# Patient Record
Sex: MALE | Race: WHITE | NOT HISPANIC OR LATINO | Employment: OTHER | URBAN - METROPOLITAN AREA
[De-identification: names, ages, dates, MRNs, and addresses within clinical notes are randomized per-mention and may not be internally consistent; named-entity substitution may affect disease eponyms.]

---

## 2018-05-17 ENCOUNTER — TRANSFERRED RECORDS (OUTPATIENT)
Dept: HEALTH INFORMATION MANAGEMENT | Facility: CLINIC | Age: 68
End: 2018-05-17

## 2018-05-25 RX ORDER — TRIPROLIDINE/PSEUDOEPHEDRINE 2.5MG-60MG
1 TABLET ORAL EVERY 6 HOURS PRN
COMMUNITY

## 2018-05-30 ENCOUNTER — ANESTHESIA EVENT (OUTPATIENT)
Dept: SURGERY | Facility: CLINIC | Age: 68
End: 2018-05-30
Payer: MEDICARE

## 2018-05-30 ENCOUNTER — ANESTHESIA (OUTPATIENT)
Dept: SURGERY | Facility: CLINIC | Age: 68
End: 2018-05-30
Payer: MEDICARE

## 2018-05-30 ENCOUNTER — HOSPITAL ENCOUNTER (OUTPATIENT)
Facility: CLINIC | Age: 68
Discharge: HOME | End: 2018-05-30
Attending: OPHTHALMOLOGY | Admitting: OPHTHALMOLOGY
Payer: MEDICARE

## 2018-05-30 VITALS
BODY MASS INDEX: 29.39 KG/M2 | WEIGHT: 217 LBS | SYSTOLIC BLOOD PRESSURE: 105 MMHG | HEART RATE: 60 BPM | HEIGHT: 72 IN | TEMPERATURE: 97.5 F | DIASTOLIC BLOOD PRESSURE: 87 MMHG | OXYGEN SATURATION: 95 % | RESPIRATION RATE: 16 BRPM

## 2018-05-30 PROCEDURE — 36000104 ZZH EYE SURGERY LEVEL 4 1ST 30 MIN: Performed by: OPHTHALMOLOGY

## 2018-05-30 PROCEDURE — 71000028 ZZH EYE RECOVERY PHASE 2 EACH 15 MINS: Performed by: OPHTHALMOLOGY

## 2018-05-30 PROCEDURE — 25000128 H RX IP 250 OP 636: Performed by: ANESTHESIOLOGY

## 2018-05-30 PROCEDURE — 25000125 ZZHC RX 250: Performed by: ANESTHESIOLOGY

## 2018-05-30 PROCEDURE — 36000105 ZZH EYE SURGERY LEVEL 4 EA 15 ADDTL MIN: Performed by: OPHTHALMOLOGY

## 2018-05-30 PROCEDURE — 27210794 ZZH OR GENERAL SUPPLY STERILE: Performed by: OPHTHALMOLOGY

## 2018-05-30 PROCEDURE — 37000009 ZZH ANESTHESIA TECHNICAL FEE, EACH ADDTL 15 MIN: Performed by: OPHTHALMOLOGY

## 2018-05-30 PROCEDURE — V2632 POST CHMBR INTRAOCULAR LENS: HCPCS | Performed by: OPHTHALMOLOGY

## 2018-05-30 PROCEDURE — 25000125 ZZHC RX 250: Performed by: OPHTHALMOLOGY

## 2018-05-30 PROCEDURE — 40000170 ZZH STATISTIC PRE-PROCEDURE ASSESSMENT II: Performed by: OPHTHALMOLOGY

## 2018-05-30 PROCEDURE — 25000128 H RX IP 250 OP 636: Performed by: NURSE ANESTHETIST, CERTIFIED REGISTERED

## 2018-05-30 PROCEDURE — 25000128 H RX IP 250 OP 636: Performed by: OPHTHALMOLOGY

## 2018-05-30 PROCEDURE — 37000008 ZZH ANESTHESIA TECHNICAL FEE, 1ST 30 MIN: Performed by: OPHTHALMOLOGY

## 2018-05-30 PROCEDURE — 25000125 ZZHC RX 250: Performed by: NURSE ANESTHETIST, CERTIFIED REGISTERED

## 2018-05-30 PROCEDURE — 27210995 ZZH RX 272: Performed by: OPHTHALMOLOGY

## 2018-05-30 DEVICE — IMPLANTABLE DEVICE: Type: IMPLANTABLE DEVICE | Site: EYE | Status: FUNCTIONAL

## 2018-05-30 RX ORDER — SODIUM CHLORIDE, SODIUM LACTATE, POTASSIUM CHLORIDE, CALCIUM CHLORIDE 600; 310; 30; 20 MG/100ML; MG/100ML; MG/100ML; MG/100ML
INJECTION, SOLUTION INTRAVENOUS CONTINUOUS
Status: DISCONTINUED | OUTPATIENT
Start: 2018-05-30 | End: 2018-05-30 | Stop reason: HOSPADM

## 2018-05-30 RX ORDER — PHENYLEPHRINE HYDROCHLORIDE 25 MG/ML
1 SOLUTION/ DROPS OPHTHALMIC
Status: COMPLETED | OUTPATIENT
Start: 2018-05-30 | End: 2018-05-30

## 2018-05-30 RX ORDER — DEXAMETHASONE SODIUM PHOSPHATE 4 MG/ML
INJECTION, SOLUTION INTRA-ARTICULAR; INTRALESIONAL; INTRAMUSCULAR; INTRAVENOUS; SOFT TISSUE PRN
Status: DISCONTINUED | OUTPATIENT
Start: 2018-05-30 | End: 2018-05-30 | Stop reason: HOSPADM

## 2018-05-30 RX ORDER — BRIMONIDINE TARTRATE 2 MG/ML
SOLUTION/ DROPS OPHTHALMIC PRN
Status: DISCONTINUED | OUTPATIENT
Start: 2018-05-30 | End: 2018-05-30 | Stop reason: HOSPADM

## 2018-05-30 RX ORDER — FENTANYL CITRATE 50 UG/ML
INJECTION, SOLUTION INTRAMUSCULAR; INTRAVENOUS PRN
Status: DISCONTINUED | OUTPATIENT
Start: 2018-05-30 | End: 2018-05-30

## 2018-05-30 RX ORDER — PROPOFOL 10 MG/ML
INJECTION, EMULSION INTRAVENOUS PRN
Status: DISCONTINUED | OUTPATIENT
Start: 2018-05-30 | End: 2018-05-30

## 2018-05-30 RX ORDER — LIDOCAINE 40 MG/G
CREAM TOPICAL
Status: DISCONTINUED | OUTPATIENT
Start: 2018-05-30 | End: 2018-05-30 | Stop reason: HOSPADM

## 2018-05-30 RX ORDER — CYCLOPENTOLATE HYDROCHLORIDE 10 MG/ML
1 SOLUTION/ DROPS OPHTHALMIC
Status: COMPLETED | OUTPATIENT
Start: 2018-05-30 | End: 2018-05-30

## 2018-05-30 RX ORDER — BALANCED SALT SOLUTION 6.4; .75; .48; .3; 3.9; 1.7 MG/ML; MG/ML; MG/ML; MG/ML; MG/ML; MG/ML
SOLUTION OPHTHALMIC PRN
Status: DISCONTINUED | OUTPATIENT
Start: 2018-05-30 | End: 2018-05-30 | Stop reason: HOSPADM

## 2018-05-30 RX ORDER — ONDANSETRON 2 MG/ML
INJECTION INTRAMUSCULAR; INTRAVENOUS PRN
Status: DISCONTINUED | OUTPATIENT
Start: 2018-05-30 | End: 2018-05-30

## 2018-05-30 RX ORDER — TIMOLOL MALEATE 5 MG/ML
SOLUTION/ DROPS OPHTHALMIC PRN
Status: DISCONTINUED | OUTPATIENT
Start: 2018-05-30 | End: 2018-05-30 | Stop reason: HOSPADM

## 2018-05-30 RX ADMIN — PHENYLEPHRINE HYDROCHLORIDE 1 DROP: 2.5 SOLUTION/ DROPS OPHTHALMIC at 07:31

## 2018-05-30 RX ADMIN — SODIUM CHLORIDE, POTASSIUM CHLORIDE, SODIUM LACTATE AND CALCIUM CHLORIDE: 600; 310; 30; 20 INJECTION, SOLUTION INTRAVENOUS at 08:49

## 2018-05-30 RX ADMIN — CYCLOPENTOLATE HYDROCHLORIDE 1 DROP: 10 SOLUTION/ DROPS OPHTHALMIC at 07:25

## 2018-05-30 RX ADMIN — PHENYLEPHRINE HYDROCHLORIDE 1 DROP: 2.5 SOLUTION/ DROPS OPHTHALMIC at 07:33

## 2018-05-30 RX ADMIN — ONDANSETRON 4 MG: 2 INJECTION INTRAMUSCULAR; INTRAVENOUS at 09:14

## 2018-05-30 RX ADMIN — CYCLOPENTOLATE HYDROCHLORIDE 1 DROP: 10 SOLUTION/ DROPS OPHTHALMIC at 07:33

## 2018-05-30 RX ADMIN — DEXMEDETOMIDINE HYDROCHLORIDE 8 MCG: 100 INJECTION, SOLUTION INTRAVENOUS at 09:31

## 2018-05-30 RX ADMIN — MIDAZOLAM 2 MG: 1 INJECTION INTRAMUSCULAR; INTRAVENOUS at 09:13

## 2018-05-30 RX ADMIN — FENTANYL CITRATE 25 MCG: 50 INJECTION, SOLUTION INTRAMUSCULAR; INTRAVENOUS at 09:31

## 2018-05-30 RX ADMIN — FENTANYL CITRATE 25 MCG: 50 INJECTION, SOLUTION INTRAMUSCULAR; INTRAVENOUS at 09:57

## 2018-05-30 RX ADMIN — CYCLOPENTOLATE HYDROCHLORIDE 1 DROP: 10 SOLUTION/ DROPS OPHTHALMIC at 07:31

## 2018-05-30 RX ADMIN — PHENYLEPHRINE HYDROCHLORIDE 1 DROP: 2.5 SOLUTION/ DROPS OPHTHALMIC at 07:25

## 2018-05-30 RX ADMIN — LIDOCAINE HYDROCHLORIDE 1 ML: 10 INJECTION, SOLUTION EPIDURAL; INFILTRATION; INTRACAUDAL; PERINEURAL at 07:33

## 2018-05-30 RX ADMIN — DEXMEDETOMIDINE HYDROCHLORIDE 8 MCG: 100 INJECTION, SOLUTION INTRAVENOUS at 09:14

## 2018-05-30 RX ADMIN — FENTANYL CITRATE 50 MCG: 50 INJECTION, SOLUTION INTRAMUSCULAR; INTRAVENOUS at 09:14

## 2018-05-30 RX ADMIN — PROPOFOL 40 MG: 10 INJECTION, EMULSION INTRAVENOUS at 09:14

## 2018-05-30 ASSESSMENT — LIFESTYLE VARIABLES: TOBACCO_USE: 1

## 2018-05-30 NOTE — OP NOTE
Procedure Date: 05/30/2018      PREOPERATIVE DIAGNOSIS:  Left dislocated intraocular lens.      POSTOPERATIVE DIAGNOSIS:  Left dislocated intraocular lens.      PROCEDURES:   1.  Left vitrectomy.   2.  Left IOL exchange.      INDICATIONS:  To improve the vision.  The risks of the operation including 1:1000 risk of infection or hemorrhage, eventual loss of all eyesight, 1:100 risk of retinal detachment requiring more surgery and uncertain visual prognosis were explained to him.  He wished to proceed.      DESCRIPTION OF PROCEDURE:  Retrobulbar anesthesia was obtained uneventfully.  Following this, the left eye was prepped and draped in the usual fashion.  A wire speculum was inserted in the left fornix.  Conjunctival peritomy was then performed from the 10 o'clock to the 2 o'clock meridian.  The bare sclera was lightly cauterized.  The eye was entered 4 mm posterior to the limbus in the inferotemporal quadrant with a 23-gauge trocar was directly visualized in the vitreous cavity and connected to 1 liter bottle of BSS to which 0.3 mL of 1:1000 adrenalin was added.  Two more 23-gauge trocars were inserted supratemporally and superonasally 4 mm posterior to the limbus.  The eye was entered with a light pipe and a vitreous cutter and a core vitrectomy under wide-angle illumination.  Most of the vitreous had already been removed from a previous vitrectomy.  Once this was completed, inspection revealed that the intraocular ACIOL was lying on the posterior pole.        At this point, a 6 mm scleral tunnel incision was made 1 mm posterior to the limbus at the 12 o'clock meridian with a 64 [place] blade and crescent blade.  It was tunneled under clear cornea.  It was then widened to its full 6 mm in size.  A chandelier had been placed at the 4 mm posterior to the limbus at the 6 o'clock meridian previously.  The eye was entered with a Maxgrip forceps and the inferior aspect of the IOL was grasped off the macular region.  The  superior haptics were then gently brought into the anterior chamber after Healon had been instilled to protect the endothelium.  Roverto forceps was introduced through the superior wound and the superior haptic was grabbed and the IOL was gently guided through the wound and out of the eye.        Following this, a 22-diopter [id number] lens was dialed into the posterior pole with an Emerald shooter.  The Healon was removed from the anterior chamber.  The superior wound was closed with three 10-0 nylon interrupted sutures, and the knots were buried.  Following this, attention was turned to repositioning of the IOL.  A toric marker was used to tiffany the 3 o'clock and 9 o'clock meridians.  A 30-gauge TSK needle was introduced 1.5 mm posterior to the limbus at the 9 o'clock meridian aiming superiorly.  The Maxgrip forceps then were used to grasp the superior haptic of the IOL and it was gently docked into the 30-gauge needle.  A second TSK needle was introduced 1.5 mm posterior to the limbus at the 3 o'clock meridian and aiming inferiorly and the Maxgrip forceps was then used to guide the inferior haptic into the 30-gauge needle.  The two 30-gauge needles were gently pulled out of the eye and the lens centered perfectly.  The ends of the haptics were flanged with hot cautery and the haptics were then repositioned into the eye.  A 360-degree scleral depression examination showed no peripheral retinal tears.  The 4 trocars were removed and closed with 8-0 Vicryl suture.  The conjunctiva was then gently closed with 6-0 plain gut suture.  Subconjunctival injections of Ancef and Decadron were given.  Alphagan, Timolol and Betadine were instilled in the left fornix.  The speculum was removed and the eye was patched in the usual fashion.  No complications were noted.         KATHY CRAIG MD             D: 05/30/2018   T: 05/30/2018   MT: DARINEL      Name:     KATHY COLE   MRN:      [id number]        Account:         [id number]   :      1950           Procedure Date: 2018      Document: [id number]

## 2018-05-30 NOTE — IP AVS SNAPSHOT
MRN:[id number]                      After Visit Summary   5/30/2018    Tyler Phelan    MRN: [id number]           Thank you!     Thank you for choosing [facility] for your care. Our goal is always to provide you with excellent care. Hearing back from our patients is one way we can continue to improve our services. Please take a few minutes to complete the written survey that you may receive in the mail after you visit with us. Thank you!        Patient Information     Date Of Birth          1950        About your hospital stay     You were admitted on:  May 30, 2018 You last received care in the:  [facility]    You were discharged on:  May 30, 2018       Who to Call     For medical emergencies, please call 911.  For non-urgent questions about your medical care, please call your primary care provider or clinic, [phone]  For questions related to your surgery, please call your surgery clinic        Attending Provider     Provider Tyler Branch MD Ophthalmology       Primary Care Provider Office Phone # Fax #    Indio Sams [phone]      After Care Instructions     Activity       Avoid strenuous activities the next several days.                  Further instructions from your care team       [facility] Discharge  Instructions  Anesthesia ([facility])   Adult Discharge Instructions    For 24 hours after surgery    1. Get plenty of rest.  Make arrangements to have a responsible adult stay with you for at least 6 hours after you leave the hospital.  2. Do not drive or use heavy equipment for 24 hours.    3. Do not drink alcohol for 24 hours.  4. Do not sign legal documents or make important decisions for 24 hours.  5. Avoid strenuous or risky activities. You may feel lightheaded.  If so, sit for a few minutes before standing.  Have someone help you get up.   6. Conscious sedation patients may  resume a regular diet..  Any questions of medical nature, call your physician.[facility], [address]  [address]  [place], [place] [id number] [phone]    PATIENT INSTRUCTIONS - RETINA SURGERY    Common retina operations    Surgery for retinal detachments.    Surgery to remove blood in the eye.    Surgery to remove scar tissue from retina.    Surgery to close macular holes.    Surgery to repair dislocated lens    After the surgery    Many retina operations involve the use of gas bubbles, or oil bubbles in the eye.  If this is true in your case, you may be asked to position a certain way following the surgery.  The nurse will go over this in detail with you.    When you go home, you can eat and drink as much as you feel comfortable.  Many retina operations make you feel less hungry or even a little nauseous.  This is to be expected.    You will be given eye drops prescriptions to fill that day.  You don t need to start the drops until the next day.  Please bring these drops with you to the doctor.      You may take a bath or shower as usual for you.  If the patch falls off, please simply leave it off.    It is normal to have some moderate discomfort, and nausea.  The doctor may give you pain medication to help with this discomfort.  If nothing was prescribed for you, you can take ibuprofen(Advil) or acetaminopen (Tylenol) as directed on the bottle. If you have significant discomfort that isn t relieved with pain medications, you should call [facility] at [phone] and speak to your doctor.    Recovering after surgery    Retina operations are not like cataract surgery.  The vision often takes weeks or months to fully improve following the surgery.      You will have eye drops to use over the first several weeks following the surgery.  The doctor will give you detailed instructions on when to take them on your post-op visit.    If positioning is  "required following the surgery, it usually is required for 5-7 days.    Most people do not feel able to return to work for at least one week and sometimes up to two or three weeks following the surgery.    Frequently asked questions    What symptoms should concern me following surgery?    You should be concerned if:    The eye becomes increasingly more painful and the pain medication stops working.    The vision gets darker or dimmer.    Green thick discharge appears.    What are the drops for?    One drop will be an antibiotic.  It is meant to prevent infection.    One drop will be Pred Forte.  It is a steroid drop.  It is meant to reduce inflammation and promote healing.  It usually is continued for the longest time and is gradually tapered over several weeks.    One drop will be atropine.  It dilates the pupil and prevents the iris from going into spasm.  It is usually used for 1-2 weeks.    Many times patients are started on other drops to lower the pressure in the eye.  These are adjusted as needed.  Sometimes, even pills are required to lower the pressure in the eye.        Pending Results     No orders found from 5/28/2018 to 5/31/2018.            Admission Information     Date & Time Provider Department Dept. Phone    5/30/2018 Tyler Parnell MD [facility] [phone]      Your Vitals Were     Blood Pressure Pulse Temperature Respirations Height Weight    138/80 (BP Location: Right arm) 62 97.5  F (36.4  C) (Temporal) 16 1.829 m (6') 98.4 kg (217 lb)    Pulse Oximetry BMI (Body Mass Index)                96% 29.43 kg/m2          MyChart Information     [facility] lets you send messages to your doctor, view your test results, renew your prescriptions, schedule appointments and more. To sign up, go to [email or web address] . Click on \"Log in\" on the left side of the screen, which will take you to the Welcome page. Then click on \"Sign up Now\" on the right side of the page.     You will " be asked to enter the access code listed below, as well as some personal information. Please follow the directions to create your username and password.     Your access code is: [email or web address]  Expires: 2018 10:38 AM     Your access code will  in 90 days. If you need help or a new code, please call your [facility] clinic or [phone].        Care EveryWhere ID     This is your Care EveryWhere ID. This could be used by other organizations to access your [facility] medical records  [id number]        Equal Access to Services     [facility]: Hadii bella joseph hadmariah Somary ann, waaxda luqadaha, qaybta kaalmatommy mosqueda, alina mar . So [facility] [place] [phone].    ATENCIÓN: Si habla español, tiene a rueda disposición servicios gratuitos de asistencia lingüística. Llame al [phone].    We comply with applicable federal civil rights laws and [place] laws. We do not discriminate on the basis of race, color, national origin, age, disability, sex, sexual orientation, or gender identity.               Review of your medicines      CONTINUE these medicines which have NOT CHANGED        Dose / Directions    aspirin 81 MG tablet        Dose:  1 tablet   Take 1 tablet by mouth daily.   Refills:  0       LISINOPRIL PO        Dose:  20 mg   Take 20 mg by mouth daily   Refills:  0       metoprolol tartrate 50 MG tablet   Commonly known as:  LOPRESSOR   Used for:  HTN (hypertension)        Dose:  50 mg   Take 1 tablet by mouth 2 times daily.   Quantity:  180 tablet   Refills:  3       MULTIVITAMIN & MINERAL PO        Take  by mouth.   Refills:  0       simvastatin 20 MG tablet   Commonly known as:  ZOCOR   Used for:  Hypercholesterolemia        Dose:  20 mg   Take 1 tablet by mouth At Bedtime.   Quantity:  90 tablet   Refills:  3       triprolidine-pseudoePHEDrine 2.5-60 MG Tabs per tablet   Commonly known as:  APRODINE        Dose:  1 tablet   Take 1 tablet by mouth every 6 hours as needed for  allergies   Refills:  0       vardenafil 20 MG tablet   Commonly known as:  LEVITRA   Used for:  Erectile dysfunction        Dose:  20 mg   Take 1 tablet by mouth daily as needed for erectile dysfunction.   Quantity:  18 tablet   Refills:  11                Protect others around you: Learn how to safely use, store and throw away your medicines at www.disposemymeds.org.             Medication List: This is a list of all your medications and when to take them. Check marks below indicate your daily home schedule. Keep this list as a reference.      Medications           Morning Afternoon Evening Bedtime As Needed    aspirin 81 MG tablet   Take 1 tablet by mouth daily.                                LISINOPRIL PO   Take 20 mg by mouth daily                                metoprolol tartrate 50 MG tablet   Commonly known as:  LOPRESSOR   Take 1 tablet by mouth 2 times daily.                                MULTIVITAMIN & MINERAL PO   Take  by mouth.                                simvastatin 20 MG tablet   Commonly known as:  ZOCOR   Take 1 tablet by mouth At Bedtime.                                triprolidine-pseudoePHEDrine 2.5-60 MG Tabs per tablet   Commonly known as:  APRODINE   Take 1 tablet by mouth every 6 hours as needed for allergies                                vardenafil 20 MG tablet   Commonly known as:  LEVITRA   Take 1 tablet by mouth daily as needed for erectile dysfunction.

## 2018-05-30 NOTE — IP AVS SNAPSHOT
[facility]    [address]    [place] [address]    Phone:  [phone]    Fax:  [phone]                                       After Visit Summary   5/30/2018    Tyler Phelan    MRN: [id number]           After Visit Summary Signature Page     I have received my discharge instructions, and my questions have been answered. I have discussed any challenges I see with this plan with the nurse or doctor.    ..........................................................................................................................................  Patient/Patient Representative Signature      ..........................................................................................................................................  Patient Representative Print Name and Relationship to Patient    ..................................................               ................................................  Date                                            Time    ..........................................................................................................................................  Reviewed by Signature/Title    ...................................................              ..............................................  Date                                                            Time

## 2018-05-30 NOTE — DISCHARGE INSTRUCTIONS
[facility] [place] [facility] Discharge  Instructions  Anesthesia ([facility])   Adult Discharge Instructions    For 24 hours after surgery    1. Get plenty of rest.  Make arrangements to have a responsible adult stay with you for at least 6 hours after you leave the hospital.  2. Do not drive or use heavy equipment for 24 hours.    3. Do not drink alcohol for 24 hours.  4. Do not sign legal documents or make important decisions for 24 hours.  5. Avoid strenuous or risky activities. You may feel lightheaded.  If so, sit for a few minutes before standing.  Have someone help you get up.   6. Conscious sedation patients may resume a regular diet..  Any questions of medical nature, call your physician.[facility], [address]  [address]  [place] [address] [phone]    PATIENT INSTRUCTIONS - RETINA SURGERY    Common retina operations    Surgery for retinal detachments.    Surgery to remove blood in the eye.    Surgery to remove scar tissue from retina.    Surgery to close macular holes.    Surgery to repair dislocated lens    After the surgery    Many retina operations involve the use of gas bubbles, or oil bubbles in the eye.  If this is true in your case, you may be asked to position a certain way following the surgery.  The nurse will go over this in detail with you.    When you go home, you can eat and drink as much as you feel comfortable.  Many retina operations make you feel less hungry or even a little nauseous.  This is to be expected.    You will be given eye drops prescriptions to fill that day.  You don t need to start the drops until the next day.  Please bring these drops with you to the doctor.      You may take a bath or shower as usual for you.  If the patch falls off, please simply leave it off.    It is normal to have some moderate discomfort, and nausea.  The doctor may give you pain medication to help with this  discomfort.  If nothing was prescribed for you, you can take ibuprofen(Advil) or acetaminopen (Tylenol) as directed on the bottle. If you have significant discomfort that isn t relieved with pain medications, you should call [facility] at [phone] and speak to your doctor.    Recovering after surgery    Retina operations are not like cataract surgery.  The vision often takes weeks or months to fully improve following the surgery.      You will have eye drops to use over the first several weeks following the surgery.  The doctor will give you detailed instructions on when to take them on your post-op visit.    If positioning is required following the surgery, it usually is required for 5-7 days.    Most people do not feel able to return to work for at least one week and sometimes up to two or three weeks following the surgery.    Frequently asked questions    What symptoms should concern me following surgery?    You should be concerned if:    The eye becomes increasingly more painful and the pain medication stops working.    The vision gets darker or dimmer.    Green thick discharge appears.    What are the drops for?    One drop will be an antibiotic.  It is meant to prevent infection.    One drop will be Pred Forte.  It is a steroid drop.  It is meant to reduce inflammation and promote healing.  It usually is continued for the longest time and is gradually tapered over several weeks.    One drop will be atropine.  It dilates the pupil and prevents the iris from going into spasm.  It is usually used for 1-2 weeks.    Many times patients are started on other drops to lower the pressure in the eye.  These are adjusted as needed.  Sometimes, even pills are required to lower the pressure in the eye.

## 2018-05-30 NOTE — OR NURSING
All discharge instructions reviewed with responsible adult. All questions answered.   Vital signs stable.  No distress. O2 saturation greater than 92% on Room Air.   Discharge to home.

## 2018-05-30 NOTE — ANESTHESIA CARE TRANSFER NOTE
Patient: Tyler Phelan    Procedure(s):  LEFT EYE VITRECTOMY PARSPLANA WITH 23 GAUGE SYSTEM, INTRAOCULAR LENS EXCHANGE WITH SCLERAL FIXATED INTRAOCULAR LENS  - Wound Class: I-Clean   - Wound Class: I-Clean    Diagnosis: DISLOCATED INTRAOCULAR LENS LEFT EYE  Diagnosis Additional Information: No value filed.    Anesthesia Type:   MAC     Note:  Airway :Room Air  Patient transferred to:PACU  Comments: To recovery, VSS, Peri Report: Identifed the Patient, Identified the Reponsible Provider, Reviewed the pertinent medical history, Discussed the surgical course, Reviewed Intra-OP anesthesia mangement and issues during anesthesia, Set expectations for post-procedure period and Allowed opportunity for questions and acknowledgement of understanding      Vitals: (Last set prior to Anesthesia Care Transfer)    CRNA VITALS  5/30/2018 0952 - 5/30/2018 1022      5/30/2018             Pulse: 67    Ht Rate: 66    SpO2: 96 %    Resp Rate (set): 10                Electronically Signed By: FLAKO Sherwood CRNA  May 30, 2018  10:22 AM

## 2018-05-30 NOTE — ANESTHESIA POSTPROCEDURE EVALUATION
Patient: Tyler Phelan    Procedure(s):  LEFT EYE VITRECTOMY PARSPLANA WITH 23 GAUGE SYSTEM, INTRAOCULAR LENS EXCHANGE WITH SCLERAL FIXATED INTRAOCULAR LENS  - Wound Class: I-Clean   - Wound Class: I-Clean    Diagnosis:DISLOCATED INTRAOCULAR LENS LEFT EYE  Diagnosis Additional Information: No value filed.    Anesthesia Type:  MAC    Note:  Anesthesia Post Evaluation    Patient location during evaluation: PACU  Patient participation: Able to fully participate in evaluation  Level of consciousness: awake  Pain management: adequate  Airway patency: patent  Cardiovascular status: acceptable  Respiratory status: acceptable  Hydration status: acceptable  PONV: none     Anesthetic complications: None          Last vitals:  Vitals:    05/30/18 0721 05/30/18 1023 05/30/18 1053   BP: 126/88 138/80 105/87   Pulse:  62 60   Resp: 16 16 16   Temp: 36.4  C (97.5  F)     SpO2: 97% 96% 95%         Electronically Signed By: Geoff Castellanos MD  May 30, 2018  12:34 PM

## 2018-05-30 NOTE — BRIEF OP NOTE
preop dx - left dislocated iol  Post op dx - same  Proc - left ppv, iol exchange  Comp - none  ebl - zero

## 2018-05-30 NOTE — ANESTHESIA PREPROCEDURE EVALUATION
Anesthesia Evaluation     . Pt has had prior anesthetic. Type: General    No history of anesthetic complications          ROS/MED HX    ENT/Pulmonary:     (+)tobacco use, Past use , . .   (-) sleep apnea   Neurologic:  - neg neurologic ROS     Cardiovascular:     (+) Dyslipidemia, hypertension----. : . . . :. .       METS/Exercise Tolerance:  >4 METS   Hematologic:         Musculoskeletal:         GI/Hepatic: Comment: Elevated LFTs in past - neg GI/hepatic ROS       Renal/Genitourinary:      (-) renal disease   Endo:         Psychiatric:         Infectious Disease:         Malignancy:         Other:                     Physical Exam  Normal systems: dental    Airway   Mallampati: I  TM distance: >3 FB  Neck ROM: full    Dental     Cardiovascular   Rhythm and rate: regular and normal      Pulmonary    breath sounds clear to auscultation                    Anesthesia Plan      History & Physical Review  History and physical reviewed and following examination; no interval change.    ASA Status:  2 .    NPO Status:  > 8 hours    Plan for MAC          Postoperative Care      Consents  Anesthetic plan, risks, benefits and alternatives discussed with:  Patient..                          .

## 2024-05-12 ENCOUNTER — HOSPITAL ENCOUNTER (EMERGENCY)
Facility: CLINIC | Age: 74
Discharge: HOME | End: 2024-05-12
Attending: EMERGENCY MEDICINE | Admitting: EMERGENCY MEDICINE
Payer: MEDICARE

## 2024-05-12 ENCOUNTER — APPOINTMENT (OUTPATIENT)
Dept: GENERAL RADIOLOGY | Facility: CLINIC | Age: 74
End: 2024-05-12
Attending: EMERGENCY MEDICINE
Payer: MEDICARE

## 2024-05-12 VITALS
HEART RATE: 67 BPM | SYSTOLIC BLOOD PRESSURE: 135 MMHG | TEMPERATURE: 98.1 F | OXYGEN SATURATION: 95 % | RESPIRATION RATE: 19 BRPM | BODY MASS INDEX: 29.84 KG/M2 | WEIGHT: 220 LBS | DIASTOLIC BLOOD PRESSURE: 87 MMHG

## 2024-05-12 DIAGNOSIS — R05.9 COUGH, UNSPECIFIED TYPE: ICD-10-CM

## 2024-05-12 DIAGNOSIS — R06.02 SHORTNESS OF BREATH: ICD-10-CM

## 2024-05-12 LAB
ALBUMIN SERPL BCG-MCNC: 4.2 G/DL (ref 3.5–5.2)
ALP SERPL-CCNC: 78 U/L (ref 40–150)
ALT SERPL W P-5'-P-CCNC: ABNORMAL U/L
ANION GAP SERPL CALCULATED.3IONS-SCNC: 10 MMOL/L (ref 7–15)
ANION GAP SERPL CALCULATED.3IONS-SCNC: 14 MMOL/L (ref 7–15)
AST SERPL W P-5'-P-CCNC: ABNORMAL U/L
ATRIAL RATE - MUSE: 74 BPM
BASOPHILS # BLD AUTO: ABNORMAL 10*3/UL
BASOPHILS # BLD MANUAL: 0.1 10E3/UL (ref 0–0.2)
BASOPHILS NFR BLD AUTO: ABNORMAL %
BASOPHILS NFR BLD MANUAL: 2 %
BILIRUB SERPL-MCNC: 0.6 MG/DL
BUN SERPL-MCNC: 14.2 MG/DL (ref 8–23)
BUN SERPL-MCNC: 15.7 MG/DL (ref 8–23)
CALCIUM SERPL-MCNC: 7.9 MG/DL (ref 8.8–10.2)
CALCIUM SERPL-MCNC: 9.2 MG/DL (ref 8.8–10.2)
CHLORIDE SERPL-SCNC: 105 MMOL/L (ref 98–107)
CHLORIDE SERPL-SCNC: 99 MMOL/L (ref 98–107)
CREAT SERPL-MCNC: 0.8 MG/DL (ref 0.67–1.17)
CREAT SERPL-MCNC: 0.85 MG/DL (ref 0.67–1.17)
DEPRECATED HCO3 PLAS-SCNC: 21 MMOL/L (ref 22–29)
DEPRECATED HCO3 PLAS-SCNC: 25 MMOL/L (ref 22–29)
DIASTOLIC BLOOD PRESSURE - MUSE: NORMAL MMHG
EGFRCR SERPLBLD CKD-EPI 2021: >90 ML/MIN/1.73M2
EGFRCR SERPLBLD CKD-EPI 2021: >90 ML/MIN/1.73M2
EOSINOPHIL # BLD AUTO: ABNORMAL 10*3/UL
EOSINOPHIL # BLD MANUAL: 0 10E3/UL (ref 0–0.7)
EOSINOPHIL NFR BLD AUTO: ABNORMAL %
EOSINOPHIL NFR BLD MANUAL: 1 %
ERYTHROCYTE [DISTWIDTH] IN BLOOD BY AUTOMATED COUNT: 13.1 % (ref 10–15)
GLUCOSE SERPL-MCNC: 100 MG/DL (ref 70–99)
GLUCOSE SERPL-MCNC: 89 MG/DL (ref 70–99)
HCT VFR BLD AUTO: 45.4 % (ref 40–53)
HGB BLD-MCNC: 15.3 G/DL (ref 13.3–17.7)
HOLD SPECIMEN: NORMAL
IMM GRANULOCYTES # BLD: ABNORMAL 10*3/UL
IMM GRANULOCYTES NFR BLD: ABNORMAL %
INTERPRETATION ECG - MUSE: NORMAL
LYMPHOCYTES # BLD AUTO: ABNORMAL 10*3/UL
LYMPHOCYTES # BLD MANUAL: 1.2 10E3/UL (ref 0.8–5.3)
LYMPHOCYTES NFR BLD AUTO: ABNORMAL %
LYMPHOCYTES NFR BLD MANUAL: 25 %
MCH RBC QN AUTO: 30.7 PG (ref 26.5–33)
MCHC RBC AUTO-ENTMCNC: 33.7 G/DL (ref 31.5–36.5)
MCV RBC AUTO: 91 FL (ref 78–100)
MONOCYTES # BLD AUTO: ABNORMAL 10*3/UL
MONOCYTES # BLD MANUAL: 0.9 10E3/UL (ref 0–1.3)
MONOCYTES NFR BLD AUTO: ABNORMAL %
MONOCYTES NFR BLD MANUAL: 19 %
NEUTROPHILS # BLD AUTO: ABNORMAL 10*3/UL
NEUTROPHILS # BLD MANUAL: 2.5 10E3/UL (ref 1.6–8.3)
NEUTROPHILS NFR BLD AUTO: ABNORMAL %
NEUTROPHILS NFR BLD MANUAL: 53 %
NRBC # BLD AUTO: 0 10E3/UL
NRBC BLD AUTO-RTO: 0 /100
NT-PROBNP SERPL-MCNC: 47 PG/ML (ref 0–900)
P AXIS - MUSE: 28 DEGREES
PLAT MORPH BLD: NORMAL
PLATELET # BLD AUTO: 116 10E3/UL (ref 150–450)
POTASSIUM SERPL-SCNC: 4.2 MMOL/L (ref 3.4–5.3)
POTASSIUM SERPL-SCNC: 5.7 MMOL/L (ref 3.4–5.3)
PR INTERVAL - MUSE: 150 MS
PROT SERPL-MCNC: 7.3 G/DL (ref 6.4–8.3)
QRS DURATION - MUSE: 98 MS
QT - MUSE: 396 MS
QTC - MUSE: 439 MS
R AXIS - MUSE: 64 DEGREES
RBC # BLD AUTO: 4.98 10E6/UL (ref 4.4–5.9)
RBC MORPH BLD: NORMAL
SODIUM SERPL-SCNC: 134 MMOL/L (ref 135–145)
SODIUM SERPL-SCNC: 140 MMOL/L (ref 135–145)
SYSTOLIC BLOOD PRESSURE - MUSE: NORMAL MMHG
T AXIS - MUSE: 63 DEGREES
TROPONIN T SERPL HS-MCNC: 11 NG/L
VENTRICULAR RATE- MUSE: 74 BPM
WBC # BLD AUTO: 4.7 10E3/UL (ref 4–11)

## 2024-05-12 PROCEDURE — 250N000009 HC RX 250: Performed by: EMERGENCY MEDICINE

## 2024-05-12 PROCEDURE — 99285 EMERGENCY DEPT VISIT HI MDM: CPT | Mod: 25

## 2024-05-12 PROCEDURE — 93005 ELECTROCARDIOGRAM TRACING: CPT

## 2024-05-12 PROCEDURE — 84484 ASSAY OF TROPONIN QUANT: CPT | Performed by: EMERGENCY MEDICINE

## 2024-05-12 PROCEDURE — 83880 ASSAY OF NATRIURETIC PEPTIDE: CPT | Performed by: EMERGENCY MEDICINE

## 2024-05-12 PROCEDURE — 80048 BASIC METABOLIC PNL TOTAL CA: CPT | Performed by: EMERGENCY MEDICINE

## 2024-05-12 PROCEDURE — 85007 BL SMEAR W/DIFF WBC COUNT: CPT | Performed by: EMERGENCY MEDICINE

## 2024-05-12 PROCEDURE — 36415 COLL VENOUS BLD VENIPUNCTURE: CPT | Performed by: EMERGENCY MEDICINE

## 2024-05-12 PROCEDURE — 85027 COMPLETE CBC AUTOMATED: CPT | Performed by: EMERGENCY MEDICINE

## 2024-05-12 PROCEDURE — 84075 ASSAY ALKALINE PHOSPHATASE: CPT | Performed by: EMERGENCY MEDICINE

## 2024-05-12 PROCEDURE — 94640 AIRWAY INHALATION TREATMENT: CPT

## 2024-05-12 PROCEDURE — 84155 ASSAY OF PROTEIN SERUM: CPT | Performed by: EMERGENCY MEDICINE

## 2024-05-12 PROCEDURE — 71046 X-RAY EXAM CHEST 2 VIEWS: CPT

## 2024-05-12 RX ORDER — BENZONATATE 100 MG/1
100 CAPSULE ORAL 3 TIMES DAILY PRN
Qty: 15 CAPSULE | Refills: 0 | Status: SHIPPED | OUTPATIENT
Start: 2024-05-12 | End: 2024-05-17

## 2024-05-12 RX ORDER — IPRATROPIUM BROMIDE AND ALBUTEROL SULFATE 2.5; .5 MG/3ML; MG/3ML
3 SOLUTION RESPIRATORY (INHALATION) ONCE
Status: COMPLETED | OUTPATIENT
Start: 2024-05-12 | End: 2024-05-12

## 2024-05-12 RX ORDER — OXYMETAZOLINE HYDROCHLORIDE 0.05 G/100ML
2 SPRAY NASAL DAILY
Qty: 6 ML | Refills: 0 | Status: SHIPPED | OUTPATIENT
Start: 2024-05-12 | End: 2024-05-17

## 2024-05-12 RX ADMIN — IPRATROPIUM BROMIDE AND ALBUTEROL SULFATE 3 ML: .5; 3 SOLUTION RESPIRATORY (INHALATION) at 12:57

## 2024-05-12 ASSESSMENT — ACTIVITIES OF DAILY LIVING (ADL)
ADLS_ACUITY_SCORE: 35

## 2024-05-12 ASSESSMENT — COLUMBIA-SUICIDE SEVERITY RATING SCALE - C-SSRS
6. HAVE YOU EVER DONE ANYTHING, STARTED TO DO ANYTHING, OR PREPARED TO DO ANYTHING TO END YOUR LIFE?: NO
1. IN THE PAST MONTH, HAVE YOU WISHED YOU WERE DEAD OR WISHED YOU COULD GO TO SLEEP AND NOT WAKE UP?: NO
2. HAVE YOU ACTUALLY HAD ANY THOUGHTS OF KILLING YOURSELF IN THE PAST MONTH?: NO

## 2024-05-12 NOTE — DISCHARGE INSTRUCTIONS
Try over the counter decongestants    Follow-up with your primary care doctor in 1 to 2 days    Return to the ER for any worsening or concerning symptoms

## 2024-05-12 NOTE — ED TRIAGE NOTES
Pt presents to ed to be evaluated for a cough, sob, and chest tightness.   Pt states that he has had a cough and a runny nose for the past month, and has also had accompanied sob and chest tightness.   Pt states that the congestion has lasted so long he thought he should get it checked out.      Triage Assessment (Adult)       Row Name 05/12/24 1156          Triage Assessment    Airway WDL WDL        Respiratory WDL    Respiratory WDL cough      Rhythm/Pattern, Respiratory shortness of breath         Cardiac WDL    Cardiac WDL chest pain   chest tightness        Cognitive/Neuro/Behavioral WDL    Cognitive/Neuro/Behavioral WDL WDL
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
## (undated) DEVICE — SYR 05ML SLIP TIP W/O NDL

## (undated) DEVICE — Device

## (undated) DEVICE — EYE KNIFE SLIT XSTAR VISITEC 3.0MM 45DEG 373730

## (undated) DEVICE — GLOVE PROTEXIS MICRO 7.5  2D73PM75

## (undated) DEVICE — EYE SHIELD PLASTIC

## (undated) DEVICE — SU ETHILON 10-0 CS160-6 12" 9000G

## (undated) DEVICE — EYE ILLUMINATOR LIGHT PIPE 25GA CHANDALIER 8065751574

## (undated) DEVICE — EYE SOL BSS 500ML

## (undated) DEVICE — DRAPE MICROSCOPE DRAPE  EYE DI40001

## (undated) DEVICE — SYR 01ML 25GA 5/8" TBC

## (undated) DEVICE — SYR 05ML SLIP TIP W/O NDL 309647

## (undated) DEVICE — NDL 22GA 1.5"

## (undated) DEVICE — EYE KNIFE CRESCENT 55DEG 373807

## (undated) DEVICE — NDL 19GA 1.5" FILTER 305200

## (undated) DEVICE — PACK VITRECTOMY PQ15VI57E

## (undated) DEVICE — BLADE KNIFE BEAVER MINI BEAVER6400

## (undated) DEVICE — GLOVE PROTEXIS MICRO 7.0  2D73PM70

## (undated) DEVICE — GLOVE PROTEXIS MICRO 6.5  2D73PM65

## (undated) DEVICE — EYE PACK 23GA CONSTELLATION PPK4254-03

## (undated) DEVICE — ESU EYE HIGH TEMP 65410-183

## (undated) DEVICE — LINEN TOWEL PACK X5 5464

## (undated) DEVICE — SU VICRYL 8-0 TG160-8 18" J547G

## (undated) DEVICE — EYE DRSG PAD OVAL

## (undated) DEVICE — SOL WATER IRRIG 1000ML BOTTLE 2F7114

## (undated) DEVICE — DRSG STERI STRIP 1/2X4" R1547

## (undated) DEVICE — SU PLAIN 6-0 TG140-8 18" 1735G

## (undated) DEVICE — EYE NDL RETROBULBAR 25GA 1.5" 581275

## (undated) DEVICE — TAPE MICROPORE 1"X1.5YD 1530S-1

## (undated) DEVICE — NDL 18GA 1.5" 305196

## (undated) DEVICE — EYE CANN IRR 30GA  ANTERIOR CHAMBER 581273

## (undated) DEVICE — EYE SOL BSS 15ML BOTTLE 65079515

## (undated) RX ORDER — TIMOLOL 5 MG/ML
SOLUTION/ DROPS OPHTHALMIC
Status: DISPENSED
Start: 2018-05-30

## (undated) RX ORDER — CEFAZOLIN SODIUM 500 MG/2.2ML
INJECTION, POWDER, FOR SOLUTION INTRAMUSCULAR; INTRAVENOUS
Status: DISPENSED
Start: 2018-05-30

## (undated) RX ORDER — GLYCOPYRROLATE 0.2 MG/ML
INJECTION, SOLUTION INTRAMUSCULAR; INTRAVENOUS
Status: DISPENSED
Start: 2018-05-30

## (undated) RX ORDER — WATER 10 ML/10ML
INJECTION INTRAMUSCULAR; INTRAVENOUS; SUBCUTANEOUS
Status: DISPENSED
Start: 2018-05-30

## (undated) RX ORDER — DEXAMETHASONE SODIUM PHOSPHATE 4 MG/ML
INJECTION, SOLUTION INTRA-ARTICULAR; INTRALESIONAL; INTRAMUSCULAR; INTRAVENOUS; SOFT TISSUE
Status: DISPENSED
Start: 2018-05-30

## (undated) RX ORDER — BRIMONIDINE TARTRATE 2 MG/ML
SOLUTION/ DROPS OPHTHALMIC
Status: DISPENSED
Start: 2018-05-30

## (undated) RX ORDER — FENTANYL CITRATE 50 UG/ML
INJECTION, SOLUTION INTRAMUSCULAR; INTRAVENOUS
Status: DISPENSED
Start: 2018-05-30

## (undated) RX ORDER — ONDANSETRON 2 MG/ML
INJECTION INTRAMUSCULAR; INTRAVENOUS
Status: DISPENSED
Start: 2018-05-30